# Patient Record
Sex: MALE | Race: WHITE | NOT HISPANIC OR LATINO | Employment: UNEMPLOYED | URBAN - METROPOLITAN AREA
[De-identification: names, ages, dates, MRNs, and addresses within clinical notes are randomized per-mention and may not be internally consistent; named-entity substitution may affect disease eponyms.]

---

## 2021-12-13 ENCOUNTER — HOSPITAL ENCOUNTER (OUTPATIENT)
Dept: GENERAL RADIOLOGY | Facility: HOSPITAL | Age: 26
Discharge: HOME | End: 2021-12-13
Admitting: PHYSICIAN ASSISTANT

## 2021-12-13 ENCOUNTER — OFFICE VISIT (OUTPATIENT)
Dept: FAMILY MEDICINE CLINIC | Facility: CLINIC | Age: 26
End: 2021-12-13

## 2021-12-13 VITALS
HEIGHT: 67 IN | WEIGHT: 136.4 LBS | HEART RATE: 91 BPM | DIASTOLIC BLOOD PRESSURE: 77 MMHG | BODY MASS INDEX: 21.41 KG/M2 | OXYGEN SATURATION: 100 % | SYSTOLIC BLOOD PRESSURE: 116 MMHG

## 2021-12-13 DIAGNOSIS — J30.9 ALLERGIC RHINITIS, UNSPECIFIED SEASONALITY, UNSPECIFIED TRIGGER: Primary | ICD-10-CM

## 2021-12-13 DIAGNOSIS — M25.562 ACUTE PAIN OF LEFT KNEE: ICD-10-CM

## 2021-12-13 DIAGNOSIS — Z13.220 SCREENING FOR LIPID DISORDERS: ICD-10-CM

## 2021-12-13 DIAGNOSIS — Z23 FLU VACCINE NEED: ICD-10-CM

## 2021-12-13 DIAGNOSIS — Z11.59 ENCOUNTER FOR HEPATITIS C SCREENING TEST FOR LOW RISK PATIENT: ICD-10-CM

## 2021-12-13 PROCEDURE — 90686 IIV4 VACC NO PRSV 0.5 ML IM: CPT | Performed by: PHYSICIAN ASSISTANT

## 2021-12-13 PROCEDURE — 90471 IMMUNIZATION ADMIN: CPT | Performed by: PHYSICIAN ASSISTANT

## 2021-12-13 PROCEDURE — 73562 X-RAY EXAM OF KNEE 3: CPT

## 2021-12-13 PROCEDURE — 99203 OFFICE O/P NEW LOW 30 MIN: CPT | Performed by: PHYSICIAN ASSISTANT

## 2021-12-13 RX ORDER — FEXOFENADINE HCL AND PSEUDOEPHEDRINE HCI 180; 240 MG/1; MG/1
1 TABLET, EXTENDED RELEASE ORAL DAILY
Qty: 30 TABLET | Refills: 5 | Status: SHIPPED | OUTPATIENT
Start: 2021-12-13

## 2021-12-13 NOTE — PROGRESS NOTES
"Chief Complaint  Establish Care (new patient) and Allergies    Subjective          Emanuel Valencia presents to [facility]  History of Present Illness     Pt presents today to establish care.    Pt states he has not had a previous pcp, pt would go to  or [facility] clinic.    Pt has a hx of allergies and currently takes Allegra D when needed.    Pt states for the last couple months he has had left knee pain off and on. Pt states the pain will occur when walking and radiate down front of leg at times. Denies any swelling, bruising or injury.    Pt states he would like to discuss getting a script for Allegra D due to they only allow you to purchase 3 different times.    Pt will be getting his flu vaccine today.    Past Medical History:   Diagnosis Date   • Allergic       Family History   Problem Relation Age of Onset   • Alcohol abuse Mother       Past Surgical History:   Procedure Laterality Date   • WISDOM TOOTH EXTRACTION  2015      Current Outpatient Medications:   •  fexofenadine-pseudoephedrine (Allegra-D Allergy & Congestion) 180-240 MG per 24 hr tablet, Take 1 tablet by mouth Daily., Disp: 30 tablet, Rfl: 5    Objective   Vital Signs:     /77 (BP Location: Left arm)   Pulse 91   Ht 170.2 cm (67\")   Wt 61.9 kg (136 lb 6.4 oz)   SpO2 100%   BMI 21.36 kg/m²    Estimated body mass index is 21.36 kg/m² as calculated from the following:    Height as of this encounter: 170.2 cm (67\").    Weight as of this encounter: 61.9 kg (136 lb 6.4 oz).     Physical Exam  Vitals and nursing note reviewed.   Constitutional:       Appearance: Normal appearance.   HENT:      Head: Normocephalic and atraumatic.   Cardiovascular:      Rate and Rhythm: Normal rate and regular rhythm.   Pulmonary:      Effort: Pulmonary effort is normal.      Breath sounds: Normal breath sounds.   Musculoskeletal:      Cervical back: Neck supple.      Comments: Left Knee - Full rom, pos crepitus, no " instability noted, no edema, no discoloration   Neurological:      Mental Status: He is alert.   Psychiatric:         Mood and Affect: Mood normal.         Behavior: Behavior normal.        Result Review :              No labs ever done       Assessment and Plan      Diagnoses and all orders for this visit:    1. Allergic rhinitis, unspecified seasonality, unspecified trigger (Primary)  -     fexofenadine-pseudoephedrine (Allegra-D Allergy & Congestion) 180-240 MG per 24 hr tablet; Take 1 tablet by mouth Daily.  Dispense: 30 tablet; Refill: 5  -     CBC & Differential; Future  -     Comprehensive Metabolic Panel; Future  -     TSH Rfx On Abnormal To Free T4; Future  -     Urinalysis With Microscopic If Indicated (No Culture) - Urine, Clean Catch; Future    2. Flu vaccine need  -     FluLaval/Fluarix/Fluzone >6 Months ([id number])    3. Acute pain of left knee  -     CBC & Differential; Future  -     XR Knee 3 View Left; Future    4. Screening for lipid disorders  -     CBC & Differential; Future  -     Comprehensive Metabolic Panel; Future  -     Lipid Panel; Future  -     TSH Rfx On Abnormal To Free T4; Future  -     Vitamin D 25 Hydroxy; Future    5. Encounter for hepatitis C screening test for low risk patient  -     Hepatitis C antibody; Future       Follow Up     Return in about 1 year (around 12/13/2022).    Patient was given instructions and counseling regarding his condition or for health maintenance advice. Please see specific information pulled into the AVS if appropriate.     I have reviewed information obtained and documented by others and I have confirmed the accuracy of this documented note.    CARLA Head

## 2021-12-14 ENCOUNTER — TELEPHONE (OUTPATIENT)
Dept: FAMILY MEDICINE CLINIC | Facility: CLINIC | Age: 26
End: 2021-12-14

## 2021-12-14 DIAGNOSIS — M25.562 ACUTE PAIN OF LEFT KNEE: Primary | ICD-10-CM

## 2021-12-14 NOTE — TELEPHONE ENCOUNTER
----- Message from CARLA Head sent at 12/13/2021  3:56 PM EST -----  Left Knee XR - normal, consult PT mars and tx

## 2022-01-06 ENCOUNTER — TELEPHONE (OUTPATIENT)
Dept: FAMILY MEDICINE CLINIC | Facility: CLINIC | Age: 27
End: 2022-01-06

## 2022-01-18 ENCOUNTER — TREATMENT (OUTPATIENT)
Dept: PHYSICAL THERAPY | Facility: CLINIC | Age: 27
End: 2022-01-18

## 2022-01-18 ENCOUNTER — LAB (OUTPATIENT)
Dept: LAB | Facility: HOSPITAL | Age: 27
End: 2022-01-18

## 2022-01-18 DIAGNOSIS — R26.9 GAIT DISTURBANCE: ICD-10-CM

## 2022-01-18 DIAGNOSIS — J30.9 ALLERGIC RHINITIS, UNSPECIFIED SEASONALITY, UNSPECIFIED TRIGGER: ICD-10-CM

## 2022-01-18 DIAGNOSIS — R29.898 WEAKNESS OF LEFT LOWER EXTREMITY: ICD-10-CM

## 2022-01-18 DIAGNOSIS — M25.562 ACUTE PAIN OF LEFT KNEE: ICD-10-CM

## 2022-01-18 DIAGNOSIS — M25.562 LEFT KNEE PAIN, UNSPECIFIED CHRONICITY: Primary | ICD-10-CM

## 2022-01-18 DIAGNOSIS — Z13.220 SCREENING FOR LIPID DISORDERS: ICD-10-CM

## 2022-01-18 LAB
BASOPHILS # BLD AUTO: 0.04 10*3/MM3 (ref 0–0.2)
BASOPHILS NFR BLD AUTO: 0.6 % (ref 0–1.5)
DEPRECATED RDW RBC AUTO: 39.9 FL (ref 37–54)
EOSINOPHIL # BLD AUTO: 0.16 10*3/MM3 (ref 0–0.4)
EOSINOPHIL NFR BLD AUTO: 2.5 % (ref 0.3–6.2)
ERYTHROCYTE [DISTWIDTH] IN BLOOD BY AUTOMATED COUNT: 12.7 % (ref 12.3–15.4)
HCT VFR BLD AUTO: 41.6 % (ref 37.5–51)
HGB BLD-MCNC: 14.2 G/DL (ref 13–17.7)
IMM GRANULOCYTES # BLD AUTO: 0.01 10*3/MM3 (ref 0–0.05)
IMM GRANULOCYTES NFR BLD AUTO: 0.2 % (ref 0–0.5)
LYMPHOCYTES # BLD AUTO: 1.36 10*3/MM3 (ref 0.7–3.1)
LYMPHOCYTES NFR BLD AUTO: 21.4 % (ref 19.6–45.3)
MCH RBC QN AUTO: 29.7 PG (ref 26.6–33)
MCHC RBC AUTO-ENTMCNC: 34.1 G/DL (ref 31.5–35.7)
MCV RBC AUTO: 87 FL (ref 79–97)
MONOCYTES # BLD AUTO: 0.65 10*3/MM3 (ref 0.1–0.9)
MONOCYTES NFR BLD AUTO: 10.2 % (ref 5–12)
NEUTROPHILS NFR BLD AUTO: 4.14 10*3/MM3 (ref 1.7–7)
NEUTROPHILS NFR BLD AUTO: 65.1 % (ref 42.7–76)
NRBC BLD AUTO-RTO: 0 /100 WBC (ref 0–0.2)
PLATELET # BLD AUTO: 188 10*3/MM3 (ref 140–450)
PMV BLD AUTO: 8.2 FL (ref 6–12)
RBC # BLD AUTO: 4.78 10*6/MM3 (ref 4.14–5.8)
WBC NRBC COR # BLD: 6.36 10*3/MM3 (ref 3.4–10.8)

## 2022-01-18 PROCEDURE — 97161 PT EVAL LOW COMPLEX 20 MIN: CPT | Performed by: PHYSICAL THERAPIST

## 2022-01-18 PROCEDURE — 36415 COLL VENOUS BLD VENIPUNCTURE: CPT

## 2022-01-18 PROCEDURE — 97110 THERAPEUTIC EXERCISES: CPT | Performed by: PHYSICAL THERAPIST

## 2022-01-18 PROCEDURE — 85025 COMPLETE CBC W/AUTO DIFF WBC: CPT

## 2022-01-18 NOTE — PROGRESS NOTES
"  Physical Therapy Initial Evaluation and Plan of Care    Patient: Emanuel Valencia   : 1995  Diagnosis/ICD-10 Code:  Left knee pain, unspecified chronicity [M25.562]  Referring practitioner: CARLA Head  Date of Initial Visit: 2022  Today's Date: 2022  Patient seen for 1 sessions           Subjective Questionnaire: LEFS: 76/80      Subjective Evaluation    History of Present Illness  Mechanism of injury: Pt presents to therapy with left knee pain started about 6 months ago when he went to stand from a kneeling position and felt a pop. He reports minimal pain, but felt \"different.\" He reports feeling better overall now, but still has feels weak at times. X-rays negative.    Pain  Current pain ratin  At best pain ratin  At worst pain ratin  Quality: dull ache  Relieving factors: change in position and rest  Aggravating factors: squatting, repetitive movement and movement    Social Support  Lives in: one-story house  Lives with: alone    Diagnostic Tests  X-ray: normal    Patient Goals  Patient goals for therapy: decreased pain, increased motion and increased strength             Objective          Palpation     Additional Palpation Details  No tenderness noted    Active Range of Motion   Left Knee   Flexion: 130 degrees   Extension: 0 degrees     Right Knee   Flexion: 130 degrees   Extension: 0 degrees     Strength/Myotome Testing     Left Knee   Flexion: 5  Extension: 4    Right Knee   Normal strength    Tests     Additional Tests Details  All ligamentous and meniscal testing intact    Ambulation     Comments   Pt demonstrates improved left hip flexion during gait (high step), minimal arm swing      See Exercise, Manual, and Modality Logs for complete treatment.     Assessment & Plan     Assessment  Impairments: abnormal gait, abnormal muscle firing, abnormal or restricted ROM, activity intolerance, impaired balance, impaired physical strength, pain with function and weight-bearing " intolerance  Functional Limitations: walking, standing and stooping  Assessment details: The patient presents to physical therapy with complaints of left knee pain, he does have weakness in the quad, but no other problems or issues stand out at this time. The patient presents with associated knee weakness, stiffness, antalgic gait, and functional deficits (LEFS). The patient would benefit from skilled PT intervention to address the above mentioned functional limitations.     Prognosis: good    Goals  Plan Goals: KNEE PROBLEMS    1. The patient has limited ROM of the left knee.   LTG 1:12 weeks:  The patient will demonstrate PRONE 120 degrees of flexion ROM for the left knee in order to allow patient to ambulate.    STATUS:  New   STG 1a: 6 weeks: The patient will demonstrate independence with HEP.    STATUS:  New    TREATMENT: Manual therapy, therapeutic exercise, home exercise instruction, and modalities as needed to include:  moist heat, electrical stimulation, ultrasound, and ice.    2. The patient has limited strength of the left knee.   LTG 2: 12 weeks: The patient will demonstrate 5 /5 strength for knee flexion and extension in order to allow patient improved joint stability    STATUS:  New   STG 2a: 6 weeks: The patient will demonstrate 4+ /5 strength for knee extension    STATUS:  New    TREATMENT: Manual therapy, therapeutic exercise, home exercise instruction, aquatic therapy, and modalities as needed to include:  moist heat, electrical stimulation, ultrasound, and ice.       4. Mobility: Walking/Moving Around Functional Limitation     LTG 4: 12 weeks:  The patient will demonstrate 0 % limitation by achieving a score of 80/80 on the LEFS.    STATUS:  New   TREATMENT:  Manual therapy, therapeutic exercise, home exercise instruction, and modalities as needed to include: moist heat, electrical stimulation, and ultrasound.       Plan  Therapy options: will be seen for skilled therapy services  Planned modality  interventions: TENS, cryotherapy, thermotherapy (hydrocollator packs) and dry needling  Planned therapy interventions: functional ROM exercises, gait training, home exercise program, manual therapy, strengthening, stretching, therapeutic activities, soft tissue mobilization, joint mobilization, neuromuscular re-education, balance/weight-bearing training and transfer training  Other planned therapy interventions: aquatic therapy  Frequency: 3x week  Duration in weeks: 12  Treatment plan discussed with: patient        Visit Diagnoses:    ICD-10-CM ICD-9-CM   1. Left knee pain, unspecified chronicity  M25.562 719.46   2. Gait disturbance  R26.9 781.2   3. Weakness of left lower extremity  R29.898 729.89       History # of Personal Factors and/or Comorbidities: LOW (0)  Examination of Body System(s): # of elements: MODERATE (3)  Clinical Presentation: STABLE   Clinical Decision Making: LOW       Timed:         Manual Therapy:    0     mins  27965;     Therapeutic Exercise:    15     mins  75427;     Neuromuscular Bird:    0    mins  98060;    Therapeutic Activity:     0     mins  28715;     Gait Trainin     mins  38033;     Ultrasound:     0     mins  91039;    Ionto                               0    mins   68809  Self Care                       0     mins   91457  Canalith Repos    0     mins 22970      Un-Timed:  Electrical Stimulation:    0     mins  18525 ( );  Dry Needling     0     mins self-pay  Traction     0     mins 99721  Low Eval     15     Mins  32721  Mod Eval     0     Mins  93341  High Eval                       0     Mins  00341  Re-Eval                           0    mins  37692    Timed Treatment:   15   mins   Total Treatment:     30   mins    PT SIGNATURE: Milton Rollins PT     Electronically signed 2022    [place] License: PT - [id number]     Initial Certification  Certification Period: 2022 thru 2022  I certify that the therapy services are furnished while this patient is  under my care.  The services outlined above are required by this patient, and will be reviewed every 90 days.     PHYSICIAN: Rj Harvey PA      DATE:     Please sign and return via fax to [phone]. Thank you, [facility].

## 2022-01-19 ENCOUNTER — TELEPHONE (OUTPATIENT)
Dept: FAMILY MEDICINE CLINIC | Facility: CLINIC | Age: 27
End: 2022-01-19

## 2022-02-08 ENCOUNTER — TREATMENT (OUTPATIENT)
Dept: PHYSICAL THERAPY | Facility: CLINIC | Age: 27
End: 2022-02-08

## 2022-02-08 DIAGNOSIS — M25.562 LEFT KNEE PAIN, UNSPECIFIED CHRONICITY: Primary | ICD-10-CM

## 2022-02-08 DIAGNOSIS — R29.898 WEAKNESS OF LEFT LOWER EXTREMITY: ICD-10-CM

## 2022-02-08 DIAGNOSIS — R26.9 GAIT DISTURBANCE: ICD-10-CM

## 2022-02-08 PROCEDURE — 97110 THERAPEUTIC EXERCISES: CPT | Performed by: PHYSICAL THERAPIST

## 2022-02-08 NOTE — PROGRESS NOTES
Physical Therapy Daily Progress Note/Discharge    Patient: Emanuel Valencia   : 1995  Diagnosis/ICD-10 Code:  Left knee pain, unspecified chronicity [M25.562]  Referring practitioner: CARLA Head  Date of Initial Visit: Type: THERAPY  Noted: 2022  Today's Date: 2022  Patient seen for 2 sessions           Subjective Evaluation    History of Present Illness    Subjective comment: Pt reporting he is doing much better, not having any trouble with kneeling, or walking or with recreational activities. He has been doing the exericses and feels they have been very helpful. He feels he is strong and wants to progress to independent HEP.Pain  Current pain ratin           Objective   See Exercise, Manual, and Modality Logs for complete treatment.       Assessment & Plan     Assessment  Impairments: abnormal gait, abnormal muscle firing, abnormal or restricted ROM, activity intolerance, impaired balance, impaired physical strength, pain with function and weight-bearing intolerance  Functional Limitations: walking, standing and stooping  Assessment details: Pt demonstrates 5/5 strength in his quads, good form with functional squats and lunges and all were pain free. He demonstrates good understanding and technique with his current HEP. Pt will be discharged from therapy at this time.  Prognosis: good    Goals  Plan Goals: KNEE PROBLEMS    1. The patient has limited ROM of the left knee.   LTG 1:12 weeks:  The patient will demonstrate PRONE 120 degrees of flexion ROM for the left knee in order to allow patient to ambulate.    STATUS:  MET   STG 1a: 6 weeks: The patient will demonstrate independence with HEP.    STATUS: MET   TREATMENT: Manual therapy, therapeutic exercise, home exercise instruction, and modalities as needed to include:  moist heat, electrical stimulation, ultrasound, and ice.    2. The patient has limited strength of the left knee.   LTG 2: 12 weeks: The patient will demonstrate 5 /5  strength for knee flexion and extension in order to allow patient improved joint stability    STATUS: MET   STG 2a: 6 weeks: The patient will demonstrate 4+ /5 strength for knee extension    STATUS:  MET   TREATMENT: Manual therapy, therapeutic exercise, home exercise instruction, aquatic therapy, and modalities as needed to include:  moist heat, electrical stimulation, ultrasound, and ice.       4. Mobility: Walking/Moving Around Functional Limitation     LTG 4: 12 weeks:  The patient will demonstrate 0 % limitation by achieving a score of 80/80 on the LEFS.    STATUS:  Not met   TREATMENT:  Manual therapy, therapeutic exercise, home exercise instruction, and modalities as needed to include: moist heat, electrical stimulation, and ultrasound.       Plan  Therapy options: will be seen for skilled therapy services  Planned modality interventions: TENS, cryotherapy, thermotherapy (hydrocollator packs) and dry needling  Planned therapy interventions: functional ROM exercises, gait training, home exercise program, manual therapy, strengthening, stretching, therapeutic activities, soft tissue mobilization, joint mobilization, neuromuscular re-education, balance/weight-bearing training and transfer training  Other planned therapy interventions: aquatic therapy  Frequency: 3x week  Duration in weeks: 12  Treatment plan discussed with: patient               Manual Therapy:    0     mins  86562;  Therapeutic Exercise:    10     mins  74660;     Neuromuscular Bird:    0    mins  74701;    Therapeutic Activity:     0     mins  51826;     Gait Trainin     mins  60107;     Ultrasound:     0     mins  64667;    Electrical Stimulation:    0     mins  76257 ( );  Dry Needling     0     mins self-pay;  Aquatic Therapy    0     mins  52897;  Mechanical Traction    0     mins  73967  Moist Heat     0     mins  No charge    Timed Treatment:   10   mins   Total Treatment:     10   mins    Milton Rollins PT  Physical  Therapist    Electronically signed 2/8/2022    [place] License: PT - [id number]

## 2022-03-07 ENCOUNTER — HOSPITAL ENCOUNTER (OUTPATIENT)
Dept: GENERAL RADIOLOGY | Facility: HOSPITAL | Age: 27
Discharge: HOME | End: 2022-03-07
Admitting: PHYSICIAN ASSISTANT

## 2022-03-07 ENCOUNTER — TELEPHONE (OUTPATIENT)
Dept: FAMILY MEDICINE CLINIC | Facility: CLINIC | Age: 27
End: 2022-03-07

## 2022-03-07 ENCOUNTER — OFFICE VISIT (OUTPATIENT)
Dept: FAMILY MEDICINE CLINIC | Facility: CLINIC | Age: 27
End: 2022-03-07

## 2022-03-07 VITALS
SYSTOLIC BLOOD PRESSURE: 114 MMHG | HEART RATE: 88 BPM | DIASTOLIC BLOOD PRESSURE: 77 MMHG | OXYGEN SATURATION: 98 % | BODY MASS INDEX: 21.67 KG/M2 | WEIGHT: 143 LBS | HEIGHT: 68 IN

## 2022-03-07 DIAGNOSIS — Z02.1 PRE-EMPLOYMENT EXAMINATION: Primary | ICD-10-CM

## 2022-03-07 DIAGNOSIS — Z02.1 PRE-EMPLOYMENT EXAMINATION: ICD-10-CM

## 2022-03-07 DIAGNOSIS — Z00.00 ANNUAL PHYSICAL EXAM: ICD-10-CM

## 2022-03-07 PROCEDURE — 99395 PREV VISIT EST AGE 18-39: CPT | Performed by: PHYSICIAN ASSISTANT

## 2022-03-07 PROCEDURE — 93000 ELECTROCARDIOGRAM COMPLETE: CPT | Performed by: PHYSICIAN ASSISTANT

## 2022-03-07 PROCEDURE — 71046 X-RAY EXAM CHEST 2 VIEWS: CPT

## 2022-03-07 NOTE — PROGRESS NOTES
"Chief Complaint  Annual Exam (Annual physical)    Subjective          Emanuel Valencia presents to [facility]  History of Present Illness  Pt presents today for annual physical.    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  Pt will be moving hopefully by the end of next month.    Pt has brought a physical form that will need to be filled out to help with employment.    Labs pending from 12/21    Preventative Counseling:  discussed with patient healthy diet and active lifestyle modifications.  Adequate sleep, daily exercise, hobbies, etc.  Avoid alcohol in excess, avoid tobacco and illicit drugs. Dental visits twice yearly for routine teeth cleanings.    Past Medical History:   Diagnosis Date   • Allergic       Family History   Problem Relation Age of Onset   • Alcohol abuse Mother       Past Surgical History:   Procedure Laterality Date   • WISDOM TOOTH EXTRACTION  2015        Current Outpatient Medications:   •  fexofenadine-pseudoephedrine (Allegra-D Allergy & Congestion) 180-240 MG per 24 hr tablet, Take 1 tablet by mouth Daily., Disp: 30 tablet, Rfl: 5    Objective     Vital Signs:     Visual acuity     /77 (BP Location: Right arm)   Pulse 88   Ht 171.5 cm (67.5\")   Wt 64.9 kg (143 lb)   SpO2 98%   BMI 22.07 kg/m²    Estimated body mass index is 22.07 kg/m² as calculated from the following:    Height as of this encounter: 171.5 cm (67.5\").    Weight as of this encounter: 64.9 kg (143 lb).     Wt Readings from Last 3 Encounters:   03/07/22 64.9 kg (143 lb)   12/13/21 61.9 kg (136 lb 6.4 oz)     BP Readings from Last 3 Encounters:   03/07/22 114/77   12/13/21 116/77     Physical Exam  Vitals and nursing note reviewed.   Constitutional:       Appearance: Normal appearance.   HENT:      Head: Normocephalic and atraumatic.      Right Ear: Tympanic membrane and ear canal normal.      Left Ear: Tympanic membrane and ear canal normal.      Nose: Nose " normal.      Mouth/Throat:      Mouth: Mucous membranes are moist.   Eyes:      Conjunctiva/sclera: Conjunctivae normal.      Pupils: Pupils are equal, round, and reactive to light.   Cardiovascular:      Rate and Rhythm: Normal rate and regular rhythm.      Heart sounds: Normal heart sounds.   Pulmonary:      Effort: Pulmonary effort is normal.      Breath sounds: Normal breath sounds.   Abdominal:      General: Abdomen is flat.   Musculoskeletal:         General: Normal range of motion.      Cervical back: Neck supple.   Skin:     General: Skin is warm.   Neurological:      General: No focal deficit present.      Mental Status: He is alert.   Psychiatric:         Mood and Affect: Mood normal.         Behavior: Behavior normal.        Result Review :            ECG 12 Lead    Date/Time: 3/7/2022 10:03 AM  Performed by: Rj Harvey PA  Authorized by: Rj Harvey PA   Comparison: not compared with previous ECG   Rhythm: sinus rhythm  Rate: normal    Clinical impression: normal ECG                Assessment and Plan      Diagnoses and all orders for this visit:    1. Pre-employment examination (Primary)  -     ECG 12 Lead  -     ABO/Rh; Future  -     XR Chest PA & Lateral; Future  -     HIV-1/O/2 Ag/Ab w Reflex; Future  -     RPR; Future    2. 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    Follow Up     Return in about 1 year (around 3/7/2023).    Patient was given instructions and counseling regarding his condition or for health maintenance advice. Please see specific information pulled into the AVS if appropriate.     I have reviewed information obtained and documented by others and I have confirmed the accuracy of this documented note.    CARLA Head

## 2022-03-07 NOTE — TELEPHONE ENCOUNTER
----- Message from CARLA Head sent at 3/7/2022  3:30 PM EST -----  Chest XR - normal, please print report to attach to his form

## 2022-03-08 ENCOUNTER — LAB (OUTPATIENT)
Dept: LAB | Facility: HOSPITAL | Age: 27
End: 2022-03-08

## 2022-03-08 ENCOUNTER — TELEPHONE (OUTPATIENT)
Dept: FAMILY MEDICINE CLINIC | Facility: CLINIC | Age: 27
End: 2022-03-08

## 2022-03-08 DIAGNOSIS — Z02.1 PRE-EMPLOYMENT EXAMINATION: ICD-10-CM

## 2022-03-08 LAB
ABO GROUP BLD: NORMAL
HIV1+2 AB SER QL: NORMAL
RH BLD: NEGATIVE

## 2022-03-08 PROCEDURE — G0432 EIA HIV-1/HIV-2 SCREEN: HCPCS

## 2022-03-08 PROCEDURE — 36415 COLL VENOUS BLD VENIPUNCTURE: CPT

## 2022-03-08 PROCEDURE — 86900 BLOOD TYPING SEROLOGIC ABO: CPT

## 2022-03-08 PROCEDURE — 86901 BLOOD TYPING SEROLOGIC RH(D): CPT

## 2022-03-08 PROCEDURE — 86592 SYPHILIS TEST NON-TREP QUAL: CPT

## 2022-03-08 NOTE — TELEPHONE ENCOUNTER
----- Message from CARLA Head sent at 3/8/2022  2:11 PM EST -----  Blood Type - O Negative - please complete on section of form

## 2022-03-09 LAB — RPR SER QL: NORMAL

## 2022-03-10 ENCOUNTER — TELEPHONE (OUTPATIENT)
Dept: FAMILY MEDICINE CLINIC | Facility: CLINIC | Age: 27
End: 2022-03-10

## 2022-03-10 NOTE — TELEPHONE ENCOUNTER
----- Message from CARLA Head sent at 3/9/2022  7:05 PM EST -----  Negative HIV and RPR - please print and complete the sections on the form for [place]